# Patient Record
Sex: MALE | Race: WHITE | Employment: FULL TIME | URBAN - METROPOLITAN AREA
[De-identification: names, ages, dates, MRNs, and addresses within clinical notes are randomized per-mention and may not be internally consistent; named-entity substitution may affect disease eponyms.]

---

## 2017-05-13 ENCOUNTER — HOSPITAL ENCOUNTER (EMERGENCY)
Facility: HOSPITAL | Age: 42
Discharge: HOME | End: 2017-05-13
Attending: EMERGENCY MEDICINE
Payer: COMMERCIAL

## 2017-05-13 VITALS
HEART RATE: 57 BPM | BODY MASS INDEX: 29.68 KG/M2 | SYSTOLIC BLOOD PRESSURE: 161 MMHG | HEIGHT: 71 IN | OXYGEN SATURATION: 97 % | DIASTOLIC BLOOD PRESSURE: 98 MMHG | WEIGHT: 212 LBS | RESPIRATION RATE: 16 BRPM | TEMPERATURE: 98 F

## 2017-05-13 DIAGNOSIS — S05.01XA CORNEAL ABRASION, RIGHT, INITIAL ENCOUNTER: Primary | ICD-10-CM

## 2017-05-13 PROCEDURE — 99283 EMERGENCY DEPT VISIT LOW MDM: CPT

## 2017-05-13 PROCEDURE — 99284 EMERGENCY DEPT VISIT MOD MDM: CPT

## 2017-05-13 RX ORDER — TETRACAINE HYDROCHLORIDE 5 MG/ML
1 SOLUTION OPHTHALMIC ONCE
Status: COMPLETED | OUTPATIENT
Start: 2017-05-13 | End: 2017-05-13

## 2017-05-13 RX ORDER — TETRACAINE HYDROCHLORIDE 5 MG/ML
SOLUTION OPHTHALMIC
Status: COMPLETED
Start: 2017-05-13 | End: 2017-05-13

## 2017-05-13 RX ORDER — ERYTHROMYCIN 5 MG/G
1 OINTMENT OPHTHALMIC EVERY 6 HOURS
Qty: 1 G | Refills: 0 | Status: SHIPPED | OUTPATIENT
Start: 2017-05-13 | End: 2017-05-20

## 2017-05-14 NOTE — ED PROVIDER NOTES
Patient Seen in: [facility] Emergency Department    History   Patient presents with: Eye Visual Problem (opthalmic)    Stated Complaint: rt eye injury    HPI    Patient is a 40-year-old male coming for evaluation for right eye pain.   Patient states Constitutional: He is oriented to person, place, and time. He appears well-developed and well-nourished. Non-toxic appearance. No distress. HENT:   Head: Normocephalic and atraumatic.    Eyes: EOM and lids are normal. Pupils are equal, round, and reactiv Corneal abrasion, right, initial encounter  (primary encounter diagnosis)    Disposition:  Discharge    Follow-up:  MD [address]  [address] [facility] [phone]    In 2 days  Return to the ER if you feel worse in any way,

## 2017-05-14 NOTE — ED INITIAL ASSESSMENT (HPI)
Pt c/o right eye pain and visual change (seeing a black spot in his peripheral vision), injury sustained after a balloon popped in his face.

## 2024-09-27 ENCOUNTER — HOSPITAL ENCOUNTER (EMERGENCY)
Facility: HOSPITAL | Age: 49
Discharge: HOME | End: 2024-09-27
Attending: EMERGENCY MEDICINE
Payer: COMMERCIAL

## 2024-09-27 VITALS
DIASTOLIC BLOOD PRESSURE: 87 MMHG | OXYGEN SATURATION: 98 % | RESPIRATION RATE: 18 BRPM | SYSTOLIC BLOOD PRESSURE: 142 MMHG | TEMPERATURE: 99 F | HEART RATE: 63 BPM

## 2024-09-27 DIAGNOSIS — R21 RASH: Primary | ICD-10-CM

## 2024-09-27 DIAGNOSIS — B35.6 TINEA CRURIS: ICD-10-CM

## 2024-09-27 DIAGNOSIS — R07.89 CHEST PAIN, ATYPICAL: ICD-10-CM

## 2024-09-27 LAB
ALBUMIN SERPL-MCNC: 4.3 G/DL (ref 3.2–4.8)
ALBUMIN/GLOB SERPL: 1.4 {RATIO} (ref 1–2)
ALP LIVER SERPL-CCNC: 92 U/L
ALT SERPL-CCNC: 23 U/L
ANION GAP SERPL CALC-SCNC: 3 MMOL/L (ref 0–18)
AST SERPL-CCNC: 19 U/L (ref ?–34)
ATRIAL RATE: 80 BPM
BASOPHILS # BLD AUTO: 0.02 X10(3) UL (ref 0–0.2)
BASOPHILS NFR BLD AUTO: 0.1 %
BILIRUB SERPL-MCNC: 0.8 MG/DL (ref 0.3–1.2)
BUN BLD-MCNC: 13 MG/DL (ref 9–23)
CALCIUM BLD-MCNC: 9.9 MG/DL (ref 8.7–10.4)
CHLORIDE SERPL-SCNC: 104 MMOL/L (ref 98–112)
CO2 SERPL-SCNC: 28 MMOL/L (ref 21–32)
CREAT BLD-MCNC: 1.02 MG/DL
EGFRCR SERPLBLD CKD-EPI 2021: 90 ML/MIN/1.73M2 (ref 60–?)
EOSINOPHIL # BLD AUTO: 0.1 X10(3) UL (ref 0–0.7)
EOSINOPHIL NFR BLD AUTO: 0.7 %
ERYTHROCYTE [DISTWIDTH] IN BLOOD BY AUTOMATED COUNT: 12.9 %
GLOBULIN PLAS-MCNC: 3.1 G/DL (ref 2–3.5)
GLUCOSE BLD-MCNC: 79 MG/DL (ref 70–99)
HCT VFR BLD AUTO: 45.7 %
HGB BLD-MCNC: 15.5 G/DL
IMM GRANULOCYTES # BLD AUTO: 0.11 X10(3) UL (ref 0–1)
IMM GRANULOCYTES NFR BLD: 0.8 %
LYMPHOCYTES # BLD AUTO: 1.73 X10(3) UL (ref 1–4)
LYMPHOCYTES NFR BLD AUTO: 12.1 %
MCH RBC QN AUTO: 28.4 PG (ref 26–34)
MCHC RBC AUTO-ENTMCNC: 33.9 G/DL (ref 31–37)
MCV RBC AUTO: 83.7 FL
MONOCYTES # BLD AUTO: 1 X10(3) UL (ref 0.1–1)
MONOCYTES NFR BLD AUTO: 7 %
NEUTROPHILS # BLD AUTO: 11.3 X10 (3) UL (ref 1.5–7.7)
NEUTROPHILS # BLD AUTO: 11.3 X10(3) UL (ref 1.5–7.7)
NEUTROPHILS NFR BLD AUTO: 79.3 %
OSMOLALITY SERPL CALC.SUM OF ELEC: 279 MOSM/KG (ref 275–295)
P AXIS: 39 DEGREES
P-R INTERVAL: 152 MS
PLATELET # BLD AUTO: 368 10(3)UL (ref 150–450)
POTASSIUM SERPL-SCNC: 3.7 MMOL/L (ref 3.5–5.1)
PROT SERPL-MCNC: 7.4 G/DL (ref 5.7–8.2)
Q-T INTERVAL: 348 MS
QRS DURATION: 96 MS
QTC CALCULATION (BEZET): 401 MS
R AXIS: -31 DEGREES
RBC # BLD AUTO: 5.46 X10(6)UL
SODIUM SERPL-SCNC: 135 MMOL/L (ref 136–145)
T AXIS: 5 DEGREES
TROPONIN I SERPL HS-MCNC: <3 NG/L
VENTRICULAR RATE: 80 BPM
WBC # BLD AUTO: 14.3 X10(3) UL (ref 4–11)

## 2024-09-27 PROCEDURE — S0028 INJECTION, FAMOTIDINE, 20 MG: HCPCS | Performed by: EMERGENCY MEDICINE

## 2024-09-27 PROCEDURE — 85025 COMPLETE CBC W/AUTO DIFF WBC: CPT | Performed by: EMERGENCY MEDICINE

## 2024-09-27 PROCEDURE — 93010 ELECTROCARDIOGRAM REPORT: CPT

## 2024-09-27 PROCEDURE — 99284 EMERGENCY DEPT VISIT MOD MDM: CPT

## 2024-09-27 PROCEDURE — 96374 THER/PROPH/DIAG INJ IV PUSH: CPT

## 2024-09-27 PROCEDURE — 93005 ELECTROCARDIOGRAM TRACING: CPT

## 2024-09-27 PROCEDURE — 80053 COMPREHEN METABOLIC PANEL: CPT | Performed by: EMERGENCY MEDICINE

## 2024-09-27 PROCEDURE — 84484 ASSAY OF TROPONIN QUANT: CPT | Performed by: EMERGENCY MEDICINE

## 2024-09-27 RX ORDER — FAMOTIDINE 10 MG/ML
20 INJECTION, SOLUTION INTRAVENOUS ONCE
Status: COMPLETED | OUTPATIENT
Start: 2024-09-27 | End: 2024-09-27

## 2024-09-27 RX ORDER — LOSARTAN POTASSIUM 25 MG/1
25 TABLET ORAL DAILY
COMMUNITY

## 2024-09-27 NOTE — DISCHARGE INSTRUCTIONS
Take Zyrtec 10 mg twice daily for the next 14 days.  Take Pepcid, 20 mg twice daily for the next 14 days.  Both these medications available over-the-counter.    Follow-up with your dermatologist.  Return to the ER for any new or worsening symptoms.

## 2024-09-27 NOTE — ED INITIAL ASSESSMENT (HPI)
Pt to er with rash to hand and feet on Monday .  Now has progressed to entire body  Pt seem at ic and started on prednesion and completed . Sent here for work up

## 2024-09-27 NOTE — ED PROVIDER NOTES
Patient Seen in: [facility] Emergency Department      History     Chief Complaint   Patient presents with    Rash Skin Problem     Stated Complaint: rash    Subjective:   HPI    Patient sent from ED care for further evaluation of a rash.  He developed a rash on his hands and feet last week.  He started on prednisone and the rash improved.  He saw dermatologist but the rash is barely there and he was told that he probably have an allergic reaction.  He did have some epigastric discomfort without initial rash.  Symptoms improve which resolved until the day he finished his prednisone and then the rash came back and it was much more diffuse.  Also was having what he felt were esophageal spasms the last for 10 for 10 to 15 minutes at a time.  Associated with vomiting.  Not exertional, no shortness of breath diaphoresis.  He has not been having any exertional symptoms or changes exercise tolerance.  He went back to immediate care today and was told to come to the ER.    Chest pains have been going on intermittently for the last 2 days.  None at present.    Notes, because of the rash that his is painful for him to walk on his feet.        Objective:   Past Medical History:    Essential hypertension              Past Surgical History:   Procedure Laterality Date    Knee surgery                  Social History     Socioeconomic History    Marital status:    Tobacco Use    Smoking status: Never     Social Determinants of Health     Food Insecurity: Low Risk  (11/23/2022)    Received from [facility]    Food Insecurity     Have there been times that your food ran out, and you didn't have money to get more?: No     Are there times that you worry that this might happen?: No   Transportation Needs: Low Risk  (11/23/2022)    Received from [facility]    Transportation Needs     Do you have trouble getting transportation to medical appointments?: No     How do you normally get  to and from your appointments?: Other   Housing Stability: Low Risk  (11/23/2022)    Received from [facility]    Housing Stability     Are you concerned about having a safe and reliable place to live?: No              Review of Systems    Positive for stated Chief Complaint: Rash Skin Problem    Other systems are as noted in HPI.  Constitutional and vital signs reviewed.      All other systems reviewed and negative except as noted above.    Physical Exam     ED Triage Vitals [09/27/24 0949]   /84   Pulse 92   Resp 18   Temp 98.5 °F (36.9 °C)   Temp src Oral   SpO2 97 %   O2 Device None (Room air)       Current Vitals:   Vital Signs  BP: 142/87  Pulse: 63  Resp: 18  Temp: 98.5 °F (36.9 °C)  Temp src: Oral  MAP (mmHg): (!) 103    Oxygen Therapy  SpO2: 98 %  O2 Device: None (Room air)            Physical Exam    Physical Exam   Constitutional: Awake, alert, well appearing  Head: Normocephalic and atraumatic.   Eyes: Conjunctivae are normal. Pupils are equal, round, and reactive to light.   Neck: Normal range of motion. No JVD  Cardiovascular: Normal rate, regular rhythm  Pulmonary/Chest: Normal effort.  No accessory muscle use.  No cyanosis.  Abdominal: Soft. Not distended.  Neurological: Pt is alert and oriented to person, place, and time. no cranial nerve deficits. Speech fluent      Urticarial rash mostly on his hands and feet less on his chest.  He has a rash on his groin that he states has had before that more consistent with a tinea cruris.    ED Course     Labs Reviewed   COMP METABOLIC PANEL (14) - Abnormal; Notable for the following components:       Result Value    Sodium 135 (*)     All other components within normal limits   CBC WITH DIFFERENTIAL WITH PLATELET - Abnormal; Notable for the following components:    WBC 14.3 (*)     Neutrophil Absolute Prelim 11.30 (*)     Neutrophil Absolute 11.30 (*)     All other components within normal limits   TROPONIN I HIGH SENSITIVITY -  Normal   RAINBOW DRAW LAVENDER   RAINBOW DRAW LIGHT GREEN   RAINBOW DRAW BLUE   RAINBOW DRAW GOLD     EKG    Rate, intervals and axes as noted on EKG Report.  Rate: 80  Rhythm: Sinus Rhythm  Reading:   Sinus rhythm no acute ischemia                 Blood work reviewed, elevated white count likely from steroids, troponin fine CMP fine    Medications   famotidine (Pepcid) 20 mg/2mL injection 20 mg (20 mg Intravenous Given 9/27/24 1045)              MDM            Differential diagnoses considered: Suspecting allergic reaction.  Likely with the GI related symptoms.  Esophageal spasms consideration.  Atypical presentation of life-threatening ACS also considered.    -ACS ruled out given time course    -Suspect allergic process, doubt anything autoimmune, viral is also possibility.    -Zyrtec twice daily x 14 days  -Pepcid twice daily x 14 days  -Benadryl as needed      I visualized the radiology studies, my independent interpretation: Chest x-ray was considered but deferred given reassuring exam and normal lung sounds    *Discussion of ongoing management of this patient's care included: n/a  *Comorbidities contributing to the complexity of decision making: n/a  *External charts reviewed: Records from [facility] dermatology reviewed, diagnoses considered included erythema multiforme and urticaria more thick multiforme  *Additional sources of history: n/a    Shared decision making was done by: patient, myself.                                     Medical Decision Making      Disposition and Plan     Clinical Impression:  1. Rash         Disposition:  Discharge  9/27/2024 11:12 am    Follow-up:  Roly Rapp  [address] [address]  [phone]    Follow up            Medications Prescribed:  Current Discharge Medication List

## (undated) NOTE — ED AVS SNAPSHOT
[facility] Emergency Department    [place]  [address] [place] [address]    Phone:  [phone]    Fax:  [address]   MRN: [id number]    Department:  [facility] Emergency Department   Date of Visit:  5/13/ IF THERE IS ANY CHANGE OR WORSENING OF YOUR CONDITION, CALL YOUR PRIMARY CARE PHYSICIAN AT ONCE OR RETURN IMMEDIATELY TO THE EMERGENCY DEPARTMENT.     If you have been prescribed any medication(s), please fill your prescription right away and begin taking t

## (undated) NOTE — ED AVS SNAPSHOT
[facility] Emergency Department    [place]  [address] [place] [address]    Phone:  [phone]    Fax:  [address]   MRN: [id number]    Department:  [facility] Emergency Department   Date of Visit:  5/13/ [phone]       To Check ER Wait Times:  TEXT 'ERwait' to [phone]      Click [email or web address]      Or call [phone]    If you have any problems with your follow-up, please call our  at [phone]    [place] problema con I have read and understand the instructions given to me by my caregivers. 24-Hour Pharmacies        Pharmacy Address Phone Number   [address] ([address]) Alvin Monroy visit,  view other health information, and more. To sign up or find more information, go to [email or web address] and click on the Sign Up Now link in the [employer] box.      Enter your [employer] Activation Code exactly as it appears below along with yo